# Patient Record
Sex: MALE | Race: WHITE | NOT HISPANIC OR LATINO
[De-identification: names, ages, dates, MRNs, and addresses within clinical notes are randomized per-mention and may not be internally consistent; named-entity substitution may affect disease eponyms.]

---

## 2020-05-27 PROBLEM — Z00.00 ENCOUNTER FOR PREVENTIVE HEALTH EXAMINATION: Status: ACTIVE | Noted: 2020-05-27

## 2020-05-28 ENCOUNTER — APPOINTMENT (OUTPATIENT)
Dept: SURGERY | Facility: CLINIC | Age: 70
End: 2020-05-28
Payer: MEDICARE

## 2020-05-28 VITALS
SYSTOLIC BLOOD PRESSURE: 135 MMHG | TEMPERATURE: 98.6 F | WEIGHT: 170 LBS | HEIGHT: 67 IN | BODY MASS INDEX: 26.68 KG/M2 | HEART RATE: 74 BPM | DIASTOLIC BLOOD PRESSURE: 82 MMHG

## 2020-05-28 PROCEDURE — 99204 OFFICE O/P NEW MOD 45 MIN: CPT

## 2020-05-28 RX ORDER — PAROXETINE HYDROCHLORIDE 20 MG/1
20 TABLET, FILM COATED ORAL
Refills: 0 | Status: ACTIVE | COMMUNITY

## 2020-05-28 RX ORDER — ALPRAZOLAM 1 MG/1
1 TABLET ORAL
Refills: 0 | Status: ACTIVE | COMMUNITY

## 2020-05-28 RX ORDER — FLAXSEED
LIQUID (ML) MUCOUS MEMBRANE
Refills: 0 | Status: ACTIVE | COMMUNITY

## 2020-05-28 NOTE — PHYSICAL EXAM
[Normal Breath Sounds] : Normal breath sounds [Normal Heart Sounds] : normal heart sounds [Alert] : alert [Oriented to Person] : oriented to person [Oriented to Place] : oriented to place [Calm] : calm [Oriented to Time] : oriented to time [de-identified] : NAD [de-identified] : benign, small right inguinal "impulse" on exertion, no obvious defect bilaterally,

## 2020-05-28 NOTE — HISTORY OF PRESENT ILLNESS
[de-identified] : The patient is referred by Dr Gonsalves for evaluation of bilateral fat containing inguinal hernias noted on a recent MRI for evaluation of BPH. He has a h/o noting a right groin pain soon after sexual intercourse  over one year ago and was [facility]. No hernia was noted then but a follow up appt with his MD in [facility], Dr Tripathi, identified on physical exam a small RIH. This was corroborated by Dr Gonsalves on a subsequent physical exam. He saw a surgeon at [facility] who did not recommend surgery. The patient is now here for another opinion. \par Her feels occasional right groin soreness and a slight burning sensation but feels no bulges. He has 3 degenerative lumbar discs which he has seen a back surgeon, undergone physical therapy and tries to manage this exercises (swimming and abdominal core exercises). He does not feel he has been limited in these exercises due to groin pain or discomfort. \par \par

## 2020-05-28 NOTE — CONSULT LETTER
[Dear  ___] : Dear  [unfilled], [Consult Letter:] : I had the pleasure of evaluating your patient, [unfilled]. [Please see my note below.] : Please see my note below. [FreeTextEntry1] : Jaime Vu- I saw Catalino Sultana about his hernias. I feel these are minimally symptomatic and would not recommend surgery unless they progress. We had a very long discussion of the pros and cons of repair vs watchful waiting. I feel since he is still able to exercise, and has more obvious lumbar disc disease, that the hernias are more likely incidental and innocuous. He will follow up if things change but seemed satisfied by the discussion and course of action. Thanks--Lupillo

## 2020-05-28 NOTE — ASSESSMENT
[FreeTextEntry1] : bilateral fat containing inguinal hernias noted on MRI, \par small minimally palpable right inguinal hernia noted\par symptoms on right side c/w ilioinguinal nerve irritation in presence of lumbar disc degeneration although hernia pain cannot be completely excluded\par \par I had a very long discussion with the patient about hernias, indications for surgery, watchful waiting strategies, overlapping of dermatomes for lumbar disc disease and inguinal hernia, as well as exercise strategies . We discussed risks and benefits. \par My recommendation is that the patient appears to be either minimally or completely asymptomatic from these inguinal hernias that were mainly noted by MRI. His exam is unremarkable for other than a small barely palpable right inguinal hernia. \par  In the light of his disc disease it is likely that this is the cause of his minimal groin symptoms and not the hernias. I would recommend watchful waiting for the hernia. We discussed the very low risk of an incarceration/strangulation. He knows what signs to look for if these hernias progress (bulge, more pain not related to his disc disease, interruption of physical activity) and will follow up if things change. All questions answered.

## 2020-12-22 ENCOUNTER — APPOINTMENT (OUTPATIENT)
Dept: SURGERY | Facility: CLINIC | Age: 70
End: 2020-12-22
Payer: MEDICARE

## 2020-12-22 VITALS
SYSTOLIC BLOOD PRESSURE: 158 MMHG | WEIGHT: 165 LBS | TEMPERATURE: 97.6 F | HEART RATE: 80 BPM | HEIGHT: 67 IN | BODY MASS INDEX: 25.9 KG/M2 | DIASTOLIC BLOOD PRESSURE: 79 MMHG

## 2020-12-22 DIAGNOSIS — K40.90 UNILATERAL INGUINAL HERNIA, W/OUT OBSTRUCTION OR GANGRENE, NOT SPECIFIED AS RECURRENT: ICD-10-CM

## 2020-12-22 PROCEDURE — 99214 OFFICE O/P EST MOD 30 MIN: CPT

## 2020-12-22 NOTE — ASSESSMENT
[FreeTextEntry1] : Long conversation. Once again I am not convinced his hernias are symptomatic enough to be repaired given his more prominent back problems. I discuses potential for fixing the hernias and his symptoms persisting. Nonetheless, hernia repair is still an option if he wants. Hew will f/u again in 2 months and if it is remaining asx but enlarging, I will suggest repeat then.

## 2020-12-22 NOTE — HISTORY OF PRESENT ILLNESS
[de-identified] : Pt returns after having a second episode of right groin pain prompting him to grab quickly , feeling a bulge that quickly subsided. He has been here before with a single episode in May 2020 of right groin pain. He has chronic 3 level lumbar disc degeneration and had an MRI that showed "bilateral fat containing inguinal hernias). On exam hje has a loose ring but no obvious bulge. Watchful waiting was suggested. He is back now with this second episode. He has had no previous symptoms since May 2020.

## 2020-12-22 NOTE — PHYSICAL EXAM
[Normal Breath Sounds] : Normal breath sounds [No Rash or Lesion] : No rash or lesion [Alert] : alert [Oriented to Person] : oriented to person [Oriented to Place] : oriented to place [Oriented to Time] : oriented to time [Calm] : calm [de-identified] : NAD [de-identified] : no obvious bulges  but loose rings noted bilateral, nontender, no impulse felt during valsalva

## 2020-12-28 ENCOUNTER — TRANSCRIPTION ENCOUNTER (OUTPATIENT)
Age: 70
End: 2020-12-28

## 2021-01-16 ENCOUNTER — TRANSCRIPTION ENCOUNTER (OUTPATIENT)
Age: 71
End: 2021-01-16

## 2021-02-16 ENCOUNTER — APPOINTMENT (OUTPATIENT)
Dept: SURGERY | Facility: CLINIC | Age: 71
End: 2021-02-16

## 2024-01-04 ENCOUNTER — NON-APPOINTMENT (OUTPATIENT)
Age: 74
End: 2024-01-04